# Patient Record
Sex: FEMALE | Race: OTHER | URBAN - METROPOLITAN AREA
[De-identification: names, ages, dates, MRNs, and addresses within clinical notes are randomized per-mention and may not be internally consistent; named-entity substitution may affect disease eponyms.]

---

## 2024-01-23 ENCOUNTER — OFFICE VISIT (OUTPATIENT)
Dept: INTERNAL MEDICINE CLINIC | Facility: CLINIC | Age: 34
End: 2024-01-23

## 2024-01-23 VITALS
HEART RATE: 64 BPM | TEMPERATURE: 98 F | BODY MASS INDEX: 24.62 KG/M2 | WEIGHT: 133.8 LBS | SYSTOLIC BLOOD PRESSURE: 128 MMHG | OXYGEN SATURATION: 99 % | HEIGHT: 62 IN | DIASTOLIC BLOOD PRESSURE: 78 MMHG

## 2024-01-23 DIAGNOSIS — M25.561 CHRONIC PAIN OF RIGHT KNEE: ICD-10-CM

## 2024-01-23 DIAGNOSIS — S89.91XA INJURY OF RIGHT KNEE, INITIAL ENCOUNTER: Primary | ICD-10-CM

## 2024-01-23 DIAGNOSIS — Z23 ENCOUNTER FOR IMMUNIZATION: ICD-10-CM

## 2024-01-23 DIAGNOSIS — M25.361 KNEE INSTABILITY, RIGHT: ICD-10-CM

## 2024-01-23 DIAGNOSIS — G89.29 CHRONIC PAIN OF RIGHT KNEE: ICD-10-CM

## 2024-01-23 PROCEDURE — 90471 IMMUNIZATION ADMIN: CPT

## 2024-01-23 PROCEDURE — 99203 OFFICE O/P NEW LOW 30 MIN: CPT | Performed by: FAMILY MEDICINE

## 2024-01-23 PROCEDURE — 90686 IIV4 VACC NO PRSV 0.5 ML IM: CPT

## 2024-01-23 NOTE — PROGRESS NOTES
Name: Traci Calderón      : 1990      MRN: [id number]  Encounter Provider: Gloria Puri MD  Encounter Date: 2024   Encounter department: [facility]    Assessment & Plan     1. Injury of right knee, initial encounter  Assessment & Plan:  See detailed HPI pt fell onto R knee ~ 4 mo ago full body weight , she had initial swelling pain , more lateral , and noted deviation of R knee when standing medially , deformed , + unstable as well , she used menthol applications and wrapped the knee and used walker x 4 weeks . 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 Encounter for immunization  -     influenza vaccine, quadrivalent, 0.5 mL, preservative-free, for adult and pediatric patients 6 mos+ (AFLURIA, FLUARIX, FLULAVAL, FLUZONE)    3. Chronic pain of right knee  -     Ambulatory Referral to Orthopedic Surgery; Future    4. Knee instability, right  -     Ambulatory Referral to Orthopedic Surgery; Future           Subjective      Knee Pain      Pt fell ~ 4 months ago injured knee , interpretor # [id number], name Omer present during exam. Pt fell ~ 4 mo ago , whole body fell on top of knee + pain, + swelling , bruising , entire knee hurt , - prior injury , no ice applied , she rubbed menthol and applied bandage , did this x she was using a walker as well did this x ~ 4 weeks , after that she has been able to walk but the pain has persisted , she has noted deviation of that knee , it feels weak and can lock as well , many times at work , R knee feels weak and she  "can mis step  trip . 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   HENT:  Negative for ear pain and sore throat.    Eyes:  Negative for pain and visual disturbance.   Respiratory:  Negative for cough and shortness of breath.    Cardiovascular:  Negative for chest pain and palpitations.   Gastrointestinal:  Negative for abdominal pain and vomiting.   Genitourinary:  Negative for dysuria and hematuria.   Musculoskeletal:  Positive for gait problem. Negative for arthralgias and back pain.        R Knee pain , deformity , weakness since her fall    Skin:  Negative for color change and rash.   Neurological:  Negative for seizures and syncope.   All other systems reviewed and are negative.      No current outpatient medications on file prior to visit.       Objective     /78 (BP Location: Right arm, Patient Position: Sitting, Cuff Size: Standard)   Pulse 64   Temp 98 °F (36.7 °C) (Temporal)   Ht 5' 2\" (1.575 m)   Wt 60.7 kg (133 lb 12.8 oz)   SpO2 99%   BMI 24.47 kg/m²     Physical Exam  Constitutional:       Comments: Skin with good color turgor , well hydrated ,no distress noted     HENT:      Head: Normocephalic and atraumatic.   Cardiovascular:      Rate and Rhythm: Normal rate and regular rhythm.      Heart sounds: Normal heart sounds.   Pulmonary:      Effort: No tachypnea or respiratory distress.      Breath sounds: Normal breath sounds.   Musculoskeletal:      Right knee: Swelling and deformity present. No crepitus. Decreased range of motion. Tenderness present over the MCL. Abnormal alignment.      Comments: R knee when standing medial lat fullness deformity , pain with lat and med movement of knee , ttp R lat knee    Neurological:      Comments: Non focal exam pt walks favoring R knee        Gloria Puri MD    "

## 2024-01-23 NOTE — ASSESSMENT & PLAN NOTE
See detailed HPI pt fell onto R knee ~ 4 mo ago full body weight , she had initial swelling pain , more lateral , and noted deviation of R knee when standing medially , deformed , + unstable as well , she used menthol applications and wrapped the knee and used walker x 4 weeks . 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

## 2024-01-30 ENCOUNTER — OFFICE VISIT (OUTPATIENT)
Dept: OBGYN CLINIC | Facility: CLINIC | Age: 34
End: 2024-01-30

## 2024-01-30 ENCOUNTER — APPOINTMENT (OUTPATIENT)
Dept: RADIOLOGY | Age: 34
End: 2024-01-30

## 2024-01-30 VITALS
SYSTOLIC BLOOD PRESSURE: 129 MMHG | HEART RATE: 66 BPM | WEIGHT: 133 LBS | DIASTOLIC BLOOD PRESSURE: 79 MMHG | HEIGHT: 62 IN | BODY MASS INDEX: 24.48 KG/M2

## 2024-01-30 DIAGNOSIS — M25.561 CHRONIC PAIN OF RIGHT KNEE: ICD-10-CM

## 2024-01-30 DIAGNOSIS — G89.29 CHRONIC PAIN OF RIGHT KNEE: ICD-10-CM

## 2024-01-30 DIAGNOSIS — S89.91XA INJURY OF RIGHT KNEE, INITIAL ENCOUNTER: ICD-10-CM

## 2024-01-30 DIAGNOSIS — M25.361 KNEE INSTABILITY, RIGHT: ICD-10-CM

## 2024-01-30 DIAGNOSIS — M17.31 POST-TRAUMATIC OSTEOARTHRITIS OF RIGHT KNEE: Primary | ICD-10-CM

## 2024-01-30 PROCEDURE — 20610 DRAIN/INJ JOINT/BURSA W/O US: CPT | Performed by: STUDENT IN AN ORGANIZED HEALTH CARE EDUCATION/TRAINING PROGRAM

## 2024-01-30 PROCEDURE — 73562 X-RAY EXAM OF KNEE 3: CPT

## 2024-01-30 PROCEDURE — 73564 X-RAY EXAM KNEE 4 OR MORE: CPT

## 2024-01-30 PROCEDURE — 99243 OFF/OP CNSLTJ NEW/EST LOW 30: CPT | Performed by: STUDENT IN AN ORGANIZED HEALTH CARE EDUCATION/TRAINING PROGRAM

## 2024-01-30 RX ORDER — BETAMETHASONE SODIUM PHOSPHATE AND BETAMETHASONE ACETATE 3; 3 MG/ML; MG/ML
12 INJECTION, SUSPENSION INTRA-ARTICULAR; INTRALESIONAL; INTRAMUSCULAR; SOFT TISSUE
Status: COMPLETED | OUTPATIENT
Start: 2024-01-30 | End: 2024-01-30

## 2024-01-30 RX ORDER — LIDOCAINE HYDROCHLORIDE 10 MG/ML
4 INJECTION, SOLUTION INFILTRATION; PERINEURAL
Status: COMPLETED | OUTPATIENT
Start: 2024-01-30 | End: 2024-01-30

## 2024-01-30 RX ADMIN — BETAMETHASONE SODIUM PHOSPHATE AND BETAMETHASONE ACETATE 12 MG: 3; 3 INJECTION, SUSPENSION INTRA-ARTICULAR; INTRALESIONAL; INTRAMUSCULAR; SOFT TISSUE at 13:30

## 2024-01-30 RX ADMIN — LIDOCAINE HYDROCHLORIDE 4 ML: 10 INJECTION, SOLUTION INFILTRATION; PERINEURAL at 13:30

## 2024-01-30 NOTE — LETTER
2024     Gloria Puri MD  [address] [place] [address]   [address]  [facility] [address]    Patient: Traci Calderón   YOB: 1990   Date of Visit: 2024       Dear Dr. Oswald Puri:    Thank you for referring Traci Calderón to me for evaluation. Below are my notes for this consultation.    If you have questions, please do not hesitate to call me. I look forward to following your patient along with you.         Sincerely,        Milton Christensen MD        CC: No Recipients    Milton Christensen MD  2024  4:52 PM  Signed        Date: 24  Traci Calderón   MRN# [id number]  : 1990      Chief Complaint: Right Knee Pain    Assessment and Plan:    1. Post-traumatic osteoarthritis of right knee  Lateral  Knee Brace    Ambulatory Referral to Physical Therapy    Large joint arthrocentesis: R knee      2. Chronic pain of right knee  Ambulatory Referral to Orthopedic Surgery    XR knee 4+ vw right injury    XR knee 3 vw left non injury    Lateral  Knee Brace    Ambulatory Referral to Physical Therapy      3. Injury of right knee, initial encounter  Ambulatory Referral to Orthopedic Surgery    XR knee 4+ vw right injury    XR knee 3 vw left non injury    Lateral  Knee Brace    Ambulatory Referral to Physical Therapy    Large joint arthrocentesis: R knee      4. Knee instability, right  Ambulatory Referral to Orthopedic Surgery    XR knee 4+ vw right injury    XR knee 3 vw left non injury    Lateral  Knee Brace    Ambulatory Referral to Physical Therapy    Large joint arthrocentesis: R knee        Traci upon examination and review of the x-rays of the right knee demonstrate signs symptoms consistent with posttraumatic osteoarthritis.  I do believe that she may have suffered a fracture at her fall in October.  However, it is not definitive.  Further delineation of care was discussed with her and the plan is as follows:    -WBAT  -Activity modification  "to limit strain or impact on the joint  -ibuprofen (Motrin) as needed  -Tylenol contraindicated given allergy  -Supervised physical therapy. Script provided   -Home exercise program directed by PT  -Lateral  brace ordered  -Corticosteroid injection was offered, accepted, and administered.  Risk benefits and alternatives were discussed prior to injection.  Patient tolerated the procedure well.  -Patient may follow up in 3 month(s) for further evaluation and treatment        The patient verbalized understanding of exam findings and treatment plan. We engaged in the shared decision-making process and treatment options were discussed at length with the patient. Surgical and conservative management discussed today along with risks and benefits. Patient was agreeable with the plan and all questions were answered to satisfaction.     Large joint arthrocentesis: R knee  Universal Protocol:  Consent: Verbal consent obtained.  Risks and benefits: risks, benefits and alternatives were discussed  Consent given by: patient  Time out: Immediately prior to procedure a \"time out\" was called to verify the correct patient, procedure, equipment, support staff and site/side marked as required.  Timeout called at: 1/30/2024 2:17 PM.  Patient understanding: patient states understanding of the procedure being performed  Site marked: the operative site was marked  Patient identity confirmed: verbally with patient  Supporting Documentation  Indications: pain   Procedure Details  Location: knee - R knee  Preparation: Patient was prepped and draped in the usual sterile fashion  Needle size: 22 G  Ultrasound guidance: no  Approach: lateral.  Medications administered: 4 mL lidocaine 1 %; 12 mg betamethasone acetate-betamethasone sodium phosphate 6 (3-3) mg/mL    Patient tolerance: patient tolerated the procedure well with no immediate complications  Dressing:  Sterile dressing applied             Subjective:     Knee Pain  Patient " presents to the clinic today with complaints of right knee pain.  A Mongolian  was utilized for the completion of this visit.  She states that she did suffer a fall in 2023.  This did result in injury to her knee resulting in the use of a walker.  She states that her pain is at the anterior and lateral aspect.  Pain is exacerbated with kneeling and bending.  She has difficulty with thinking of her child due to this pain.  She denies any gross instability.  She does localize pain to the anterior lateral aspect of her knee and describes as moderate sharp and symptoms aching pain.  Today she denies any distal paresthesias.      External Records Reviewed: office notes and x-ray reports    Prior treatment:  NSAIDs Yes    Bracing Yes   Physical Therapy No   Cortisone Injections No   Viscosupplementation No     Allergy:  Allergies   Allergen Reactions   • Paracetamol [Acetaminophen] Rash     Medications:  All current active meds have been reviewed   Past Medical History:  History reviewed. No pertinent past medical history.  Past Surgical History:  Past Surgical History:   Procedure Laterality Date   •  SECTION           Family History:  Family History   Problem Relation Age of Onset   • No Known Problems Mother    • Hypertension Father    • No Known Problems Sister    • No Known Problems Sister    • No Known Problems Sister    • No Known Problems Brother    • No Known Problems Daughter      Social History:  Social History     Substance and Sexual Activity   Alcohol Use Not Currently    Comment: occ     Social History     Substance and Sexual Activity   Drug Use Never     Social History     Tobacco Use   Smoking Status Never   Smokeless Tobacco Never           Review of Systems:  General- denies fever/chills  HEENT- denies hearing loss or sore throat  Eyes- denies eye pain or visual disturbances, denies red eyes  Respiratory- denies cough or SOB  Cardio- denies chest pain or  "palpitations  GI- denies abdominal pain  Endocrine- denies urinary frequency  Urinary- denies pain with urination  Musculoskeletal- Negative except noted above  Skin- denies rashes or wounds  Neurological- denies dizziness or headache  Psychiatric- denies anxiety or difficulty concentrating      Objective:   BP Readings from Last 1 Encounters:   01/30/24 129/79      Wt Readings from Last 1 Encounters:   01/30/24 60.3 kg (133 lb)      Pulse Readings from Last 1 Encounters:   01/30/24 66        BMI: Estimated body mass index is 24.33 kg/m² as calculated from the following:    Height as of this encounter: 5' 2\" (1.575 m).    Weight as of this encounter: 60.3 kg (133 lb).      Physical Exam  /79   Pulse 66   Ht 5' 2\" (1.575 m)   Wt 60.3 kg (133 lb)   BMI 24.33 kg/m²   General/Constitutional: No apparent distress: well-nourished and well developed.  Eyes: normal ocular motion  Cardio: RRR, Normal S1S2, No m/r/g.   Lymphatic: No appreciable lymphadenopathy  Respiratory: Non-labored breathing, CTA b/l no w/c/r  Vascular: No edema, swelling or tenderness, except as noted in detailed exam. Extremities well perfused. No LE edema  Integumentary: No impressive skin lesions present, except as noted in detailed exam.  Neuro: No ataxia or tremors noted  Psych: Normal mood and affect, oriented to person, place and time. Appropriate affect.  Musculoskeletal: Normal, except as noted in detailed exam and in HPI.    Gait and Station:   antalgic    Bilateral Lower Extremity:  Right Knee:      Inspection:  normal color, temperature, turgor and moisture    Overall limb alignment: valgus    Effusion: no    ROM 0 to 130 with pain    Extensor Lag: Absent    Palpation: Lateral joint line tenderness to palpation    stable to AP translation at 90 deg    Coronal plane unstable in full extension    Coronal plane unstable in mid-flexion     Motor: 5/5 EHL/FHL/TA/GS/Qd/Hs    Vascular: Toes WWP with BCR    Sensory: SILT " DP/SP/Irma/Saph/Ti    Left Knee:      Inspection: No erythema, ecchymosis, abrasions, muscle atrophy or scars present    Overall limb alignment: neutral    Effusion: no    ROM 0 to 140 without pain    Extensor Lag: Absent    Palpation: No joint line tenderness to palpation    Stable to AP translation at 90 deg    Coronal plane stable in full extension    Coronal plane stable in mid-flexion     Motor: 5/5 EHL/FHL/TA/GS/Qd/Hs    Vascular: Toes WWP with BCR    Sensory: SILT DP/SP/Irma/Saph/Tib         Images:  I personally reviewed relevant images in the PACS system and my interpretation is as follows:    X-rays of the right knee: Demonstrate moderate lateral compartment post traumatic osteoarthritis with a depression of the lateral tibial plateau when compared to the contralateral side.        Scribe Attestation      I,:  Roni Pak am acting as a scribe while in the presence of the attending physician.:       I,:  Milton Christensen MD personally performed the services described in this documentation    as scribed in my presence.:               Milton Christensen MD  Adult Reconstruction Specialist   [facility]

## 2024-01-30 NOTE — PROGRESS NOTES
Date: 24  Traci Calderón   MRN# [id number]  : 1990      Chief Complaint: Right Knee Pain    Assessment and Plan:    1. Post-traumatic osteoarthritis of right knee  Lateral  Knee Brace    Ambulatory Referral to Physical Therapy    Large joint arthrocentesis: R knee      2. Chronic pain of right knee  Ambulatory Referral to Orthopedic Surgery    XR knee 4+ vw right injury    XR knee 3 vw left non injury    Lateral  Knee Brace    Ambulatory Referral to Physical Therapy      3. Injury of right knee, initial encounter  Ambulatory Referral to Orthopedic Surgery    XR knee 4+ vw right injury    XR knee 3 vw left non injury    Lateral  Knee Brace    Ambulatory Referral to Physical Therapy    Large joint arthrocentesis: R knee      4. Knee instability, right  Ambulatory Referral to Orthopedic Surgery    XR knee 4+ vw right injury    XR knee 3 vw left non injury    Lateral  Knee Brace    Ambulatory Referral to Physical Therapy    Large joint arthrocentesis: R knee        Traci upon examination and review of the x-rays of the right knee demonstrate signs symptoms consistent with posttraumatic osteoarthritis.  I do believe that she may have suffered a fracture at her fall in October.  However, it is not definitive.  Further delineation of care was discussed with her and the plan is as follows:    -WBAT  -Activity modification to limit strain or impact on the joint  -ibuprofen (Motrin) as needed  -Tylenol contraindicated given allergy  -Supervised physical therapy. Script provided   -Home exercise program directed by PT  -Lateral  brace ordered  -Corticosteroid injection was offered, accepted, and administered.  Risk benefits and alternatives were discussed prior to injection.  Patient tolerated the procedure well.  -Patient may follow up in 3 month(s) for further evaluation and treatment        The patient verbalized understanding of exam findings and treatment plan.  "We engaged in the shared decision-making process and treatment options were discussed at length with the patient. Surgical and conservative management discussed today along with risks and benefits. Patient was agreeable with the plan and all questions were answered to satisfaction.     Large joint arthrocentesis: R knee  Universal Protocol:  Consent: Verbal consent obtained.  Risks and benefits: risks, benefits and alternatives were discussed  Consent given by: patient  Time out: Immediately prior to procedure a \"time out\" was called to verify the correct patient, procedure, equipment, support staff and site/side marked as required.  Timeout called at: 1/30/2024 2:17 PM.  Patient understanding: patient states understanding of the procedure being performed  Site marked: the operative site was marked  Patient identity confirmed: verbally with patient  Supporting Documentation  Indications: pain   Procedure Details  Location: knee - R knee  Preparation: Patient was prepped and draped in the usual sterile fashion  Needle size: 22 G  Ultrasound guidance: no  Approach: lateral.  Medications administered: 4 mL lidocaine 1 %; 12 mg betamethasone acetate-betamethasone sodium phosphate 6 (3-3) mg/mL    Patient tolerance: patient tolerated the procedure well with no immediate complications  Dressing:  Sterile dressing applied             Subjective:     Knee Pain  Patient presents to the clinic today with complaints of right knee pain.  A Vietnamese  was utilized for the completion of this visit.  She states that she did suffer a fall in October 2023.  This did result in injury to her knee resulting in the use of a walker.  She states that her pain is at the anterior and lateral aspect.  Pain is exacerbated with kneeling and bending.  She has difficulty with thinking of her child due to this pain.  She denies any gross instability.  She does localize pain to the anterior lateral aspect of her knee and " describes as moderate sharp and symptoms aching pain.  Today she denies any distal paresthesias.      External Records Reviewed: office notes and x-ray reports    Prior treatment:  NSAIDs Yes    Bracing Yes   Physical Therapy No   Cortisone Injections No   Viscosupplementation No     Allergy:  Allergies   Allergen Reactions    Paracetamol [Acetaminophen] Rash     Medications:  All current active meds have been reviewed   Past Medical History:  History reviewed. No pertinent past medical history.  Past Surgical History:  Past Surgical History:   Procedure Laterality Date     SECTION           Family History:  Family History   Problem Relation Age of Onset    No Known Problems Mother     Hypertension Father     No Known Problems Sister     No Known Problems Sister     No Known Problems Sister     No Known Problems Brother     No Known Problems Daughter      Social History:  Social History     Substance and Sexual Activity   Alcohol Use Not Currently    Comment: occ     Social History     Substance and Sexual Activity   Drug Use Never     Social History     Tobacco Use   Smoking Status Never   Smokeless Tobacco Never           Review of Systems:  General- denies fever/chills  HEENT- denies hearing loss or sore throat  Eyes- denies eye pain or visual disturbances, denies red eyes  Respiratory- denies cough or SOB  Cardio- denies chest pain or palpitations  GI- denies abdominal pain  Endocrine- denies urinary frequency  Urinary- denies pain with urination  Musculoskeletal- Negative except noted above  Skin- denies rashes or wounds  Neurological- denies dizziness or headache  Psychiatric- denies anxiety or difficulty concentrating      Objective:   BP Readings from Last 1 Encounters:   24 129/79      Wt Readings from Last 1 Encounters:   24 60.3 kg (133 lb)      Pulse Readings from Last 1 Encounters:   24 66        BMI: Estimated body mass index is 24.33 kg/m² as calculated from the  "following:    Height as of this encounter: 5' 2\" (1.575 m).    Weight as of this encounter: 60.3 kg (133 lb).      Physical Exam  /79   Pulse 66   Ht 5' 2\" (1.575 m)   Wt 60.3 kg (133 lb)   BMI 24.33 kg/m²   General/Constitutional: No apparent distress: well-nourished and well developed.  Eyes: normal ocular motion  Cardio: RRR, Normal S1S2, No m/r/g.   Lymphatic: No appreciable lymphadenopathy  Respiratory: Non-labored breathing, CTA b/l no w/c/r  Vascular: No edema, swelling or tenderness, except as noted in detailed exam. Extremities well perfused. No LE edema  Integumentary: No impressive skin lesions present, except as noted in detailed exam.  Neuro: No ataxia or tremors noted  Psych: Normal mood and affect, oriented to person, place and time. Appropriate affect.  Musculoskeletal: Normal, except as noted in detailed exam and in HPI.    Gait and Station:   antalgic    Bilateral Lower Extremity:  Right Knee:      Inspection:  normal color, temperature, turgor and moisture    Overall limb alignment: valgus    Effusion: no    ROM 0 to 130 with pain    Extensor Lag: Absent    Palpation: Lateral joint line tenderness to palpation    stable to AP translation at 90 deg    Coronal plane unstable in full extension    Coronal plane unstable in mid-flexion     Motor: 5/5 EHL/FHL/TA/GS/Qd/Hs    Vascular: Toes WWP with BCR    Sensory: SILT DP/SP/Irma/Saph/Ti    Left Knee:      Inspection: No erythema, ecchymosis, abrasions, muscle atrophy or scars present    Overall limb alignment: neutral    Effusion: no    ROM 0 to 140 without pain    Extensor Lag: Absent    Palpation: No joint line tenderness to palpation    Stable to AP translation at 90 deg    Coronal plane stable in full extension    Coronal plane stable in mid-flexion     Motor: 5/5 EHL/FHL/TA/GS/Qd/Hs    Vascular: Toes WWP with BCR    Sensory: SILT DP/SP/Irma/Saph/Tib         Images:  I personally reviewed relevant images in the PACS system and my " interpretation is as follows:    X-rays of the right knee: Demonstrate moderate lateral compartment post traumatic osteoarthritis with a depression of the lateral tibial plateau when compared to the contralateral side.        Scribe Attestation      I,:  Roni Pak am acting as a scribe while in the presence of the attending physician.:       I,:  Milton Christensen MD personally performed the services described in this documentation    as scribed in my presence.:               Milton Christensen MD  Adult Reconstruction Specialist   [facility]

## 2024-04-30 ENCOUNTER — OFFICE VISIT (OUTPATIENT)
Dept: OBGYN CLINIC | Facility: CLINIC | Age: 34
End: 2024-04-30

## 2024-04-30 VITALS
DIASTOLIC BLOOD PRESSURE: 78 MMHG | WEIGHT: 133 LBS | HEART RATE: 54 BPM | HEIGHT: 62 IN | SYSTOLIC BLOOD PRESSURE: 127 MMHG | BODY MASS INDEX: 24.48 KG/M2

## 2024-04-30 DIAGNOSIS — M17.31 POST-TRAUMATIC OSTEOARTHRITIS OF RIGHT KNEE: Primary | ICD-10-CM

## 2024-04-30 PROCEDURE — 99214 OFFICE O/P EST MOD 30 MIN: CPT | Performed by: STUDENT IN AN ORGANIZED HEALTH CARE EDUCATION/TRAINING PROGRAM

## 2024-04-30 PROCEDURE — 20610 DRAIN/INJ JOINT/BURSA W/O US: CPT

## 2024-04-30 RX ORDER — LIDOCAINE HYDROCHLORIDE 10 MG/ML
1 INJECTION, SOLUTION INFILTRATION; PERINEURAL
Status: COMPLETED | OUTPATIENT
Start: 2024-04-30 | End: 2024-04-30

## 2024-04-30 RX ORDER — BUPIVACAINE HYDROCHLORIDE 2.5 MG/ML
1 INJECTION, SOLUTION INFILTRATION; PERINEURAL
Status: COMPLETED | OUTPATIENT
Start: 2024-04-30 | End: 2024-04-30

## 2024-04-30 RX ORDER — BETAMETHASONE SODIUM PHOSPHATE AND BETAMETHASONE ACETATE 3; 3 MG/ML; MG/ML
12 INJECTION, SUSPENSION INTRA-ARTICULAR; INTRALESIONAL; INTRAMUSCULAR; SOFT TISSUE
Status: COMPLETED | OUTPATIENT
Start: 2024-04-30 | End: 2024-04-30

## 2024-04-30 RX ADMIN — LIDOCAINE HYDROCHLORIDE 1 ML: 10 INJECTION, SOLUTION INFILTRATION; PERINEURAL at 12:45

## 2024-04-30 RX ADMIN — BETAMETHASONE SODIUM PHOSPHATE AND BETAMETHASONE ACETATE 12 MG: 3; 3 INJECTION, SUSPENSION INTRA-ARTICULAR; INTRALESIONAL; INTRAMUSCULAR; SOFT TISSUE at 12:45

## 2024-04-30 RX ADMIN — BUPIVACAINE HYDROCHLORIDE 1 ML: 2.5 INJECTION, SOLUTION INFILTRATION; PERINEURAL at 12:45

## 2024-04-30 NOTE — PROGRESS NOTES
Date: 24  Traci Calderón   MRN# [id number]  : 1990      Chief Complaint: Right Knee Pain    Assessment and Plan:  The patient verbalized understanding of exam findings and treatment plan. We engaged in the shared decision-making process and treatment options were discussed at length with the patient. Surgical and conservative management discussed today along with risks and benefits. Patient was agreeable with the plan and all questions were answered to satisfaction.     Post-traumatic osteoarthritis of right knee  Patient is a f/u for right knee osteoarthritis  -WBAT  -Activity modification to limit strain or impact on the joint  -celecoxib (Celebrex) as needed  -Tylenol 1000mg up to three times daily as needed. Do not exceed 3000mg daily  -Knee sleeve or brace for comfort - lateral  brace ordered  -Cane or walker recommended to offload joint  -Corticosteroid injection was offered, accepted, and administered.  Risk benefits and alternatives were discussed prior to injection.  Patient tolerated the procedure well.  - Patient is a candidate for a total knee replacement. She cannot take off work now and will defer and continue with injections   -Patient may follow up prn for further evaluation and treatment        Subjective:   Traci Calderón is a 33 y.o. female who is being seen in follow-up for Right knee pain. Patient states she got about 1 month of relief from her previous injection. When we last saw she we recommended monitor for inj improvement.  Pain improved then worsened. No other orthopedic complaints or concerns.     Prior treatment:  NSAIDs Yes    Bracing No   Physical Therapy Yes   Cortisone Injections Yes   Viscosupplementation No     Allergy:  Allergies   Allergen Reactions    Paracetamol [Acetaminophen] Rash     Medications:  All current active meds have been reviewed   Past Medical History:  History reviewed. No pertinent past medical history.  Past Surgical  "History:  Past Surgical History:   Procedure Laterality Date     SECTION           Family History:  Family History   Problem Relation Age of Onset    No Known Problems Mother     Hypertension Father     No Known Problems Sister     No Known Problems Sister     No Known Problems Sister     No Known Problems Brother     No Known Problems Daughter      Social History:  Social History     Substance and Sexual Activity   Alcohol Use Not Currently    Comment: occ     Social History     Substance and Sexual Activity   Drug Use Never     Social History     Tobacco Use   Smoking Status Never   Smokeless Tobacco Never           Review of Systems:  General- denies fever/chills  HEENT- denies hearing loss or sore throat  Eyes- denies eye pain or visual disturbances, denies red eyes  Respiratory- denies cough or SOB  Cardio- denies chest pain or palpitations  GI- denies abdominal pain  Endocrine- denies urinary frequency  Urinary- denies pain with urination  Musculoskeletal- Negative except noted above  Skin- denies rashes or wounds  Neurological- denies dizziness or headache  Psychiatric- denies anxiety or difficulty concentrating    Objective:   BP Readings from Last 1 Encounters:   24 127/78      Wt Readings from Last 1 Encounters:   24 60.3 kg (133 lb)      Pulse Readings from Last 1 Encounters:   24 (!) 54        BMI: Estimated body mass index is 24.33 kg/m² as calculated from the following:    Height as of this encounter: 5' 2\" (1.575 m).    Weight as of this encounter: 60.3 kg (133 lb).    Physical Exam  /78   Pulse (!) 54   Ht 5' 2\" (1.575 m)   Wt 60.3 kg (133 lb)   BMI 24.33 kg/m²   General/Constitutional: No apparent distress: well-nourished and well developed.  Eyes: normal ocular motion  Cardio: RRR, Normal S1S2, No m/r/g.   Lymphatic: No appreciable lymphadenopathy  Respiratory: Non-labored breathing, CTA b/l no w/c/r  Vascular: No edema, swelling or tenderness, except as " noted in detailed exam. Extremities well perfused. No LE edema  Integumentary: No impressive skin lesions present, except as noted in detailed exam.  Neuro: No ataxia or tremors noted  Psych: Normal mood and affect, oriented to person, place and time. Appropriate affect.  Musculoskeletal: Normal, except as noted in detailed exam and in HPI.    Gait and Station:   normal    Right Knee Exam:    Right Knee:      Inspection:  normal color, temperature, turgor and moisture    Overall limb alignment: valgus  Fullness in popliteal fossa    Effusion: no    ROM 0 to 130 with pain    Extensor Lag: Absent    Palpation: Lateral joint line tenderness to palpation    stable to AP translation at 90 deg    Coronal plane unstable in full extension    Coronal plane unstable in mid-flexion     Motor: 5/5 EHL/FHL/TA/GS/Qd/Hs    Vascular: Toes WWP with BCR    Sensory: SILT DP/SP/Irma/Saph/Ti      Images:  No new imaging    Large joint arthrocentesis: R knee  Universal Protocol:  Consent: Verbal consent obtained.  Consent given by: patient  Timeout called at: 4/30/2024 1:22 PM.  Patient understanding: patient states understanding of the procedure being performed  Patient identity confirmed: verbally with patient  Supporting Documentation  Indications: pain and diagnostic evaluation   Procedure Details  Location: knee - R knee  Needle size: 22 G  Ultrasound guidance: no  Approach: anterolateral  Medications administered: 12 mg betamethasone acetate-betamethasone sodium phosphate 6 (3-3) mg/mL; 1 mL bupivacaine 0.25 %; 1 mL lidocaine 1 %    Patient tolerance: patient tolerated the procedure well with no immediate complications  Dressing:  Sterile dressing applied            Scribe Attestation      I,:  Emanuel Bell PA-C am acting as a scribe while in the presence of the attending physician.:       I,:  Milton Christensen MD personally performed the services described in this documentation    as scribed in my presence.:               Milton  MD Fermin  Adult Reconstruction Specialist   [facility]

## 2024-04-30 NOTE — ASSESSMENT & PLAN NOTE
Patient is a f/u for right knee osteoarthritis  -WBAT  -Activity modification to limit strain or impact on the joint  -celecoxib (Celebrex) as needed  -Tylenol 1000mg up to three times daily as needed. Do not exceed 3000mg daily  -Knee sleeve or brace for comfort - lateral  brace ordered  -Cane or walker recommended to offload joint  -Corticosteroid injection was offered, accepted, and administered.  Risk benefits and alternatives were discussed prior to injection.  Patient tolerated the procedure well.  - Patient is a candidate for a total knee replacement. She cannot take off work now and will defer and continue with injections   -Patient may follow up in 3 months for further evaluation and treatment

## 2024-04-30 NOTE — PATIENT INSTRUCTIONS
Below are a list of brace makers to obtain the knee brace.       [facility]  [address] [place] [address]  Directions  [place] Phone: [phone]  [place] Fax: [phone]    [facility] [address]  [address]  [place] [address]  (By Appointment Only)  Directions  [address]  [address]  [place] [address]  Directions  [place] Phone: [phone]  [place] Fax: [phone]    [facility] Location  [address], [address]  [place] [facility] [address]  [phone] (fax)    [facility] Location  [address], [address]  [place] [address]  [phone] (fax)

## 2024-09-03 ENCOUNTER — OFFICE VISIT (OUTPATIENT)
Dept: DENTISTRY | Facility: CLINIC | Age: 34
End: 2024-09-03

## 2024-09-03 DIAGNOSIS — Z01.20 ENCOUNTER FOR DENTAL EXAMINATION: Primary | ICD-10-CM

## 2024-09-03 PROCEDURE — D0210 INTRAORAL - COMPLETE SERIES OF RADIOGRAPHIC IMAGES: HCPCS

## 2024-09-03 PROCEDURE — D0150 COMPREHENSIVE ORAL EVALUATION - NEW OR ESTABLISHED PATIENT: HCPCS

## 2024-09-03 NOTE — DENTAL PROCEDURE DETAILS
COMP EXAM, FMX, PROBE EXAM   REVIEWED MED HX: meds, allergies, health changes reviewed in EPIC  CHIEF CONCERN:  My tooth root on #12 bothers me   -Last dental visit was More than a year ago  PAIN SCALE:  0  ASA CLASS:  ASA 2 - Patient with mild systemic disease with no functional limitations  PLAQUE:  moderate  CALCULUS: Moderate  BLEEDING:  moderate  STAIN :  Moderate  PERIO: Gingivitis, localized pockets on 30 due to supraeruption    Visual and Tactile Intraoral/ Extraoral evaluation: Oral and Oropharyngeal cancer evaluation. No findings     Dr. Castrejon -  Reviewed with patient clinical and radiographic findings and patient verbalized understanding. All questions and concerns addressed.     REFERRALS: Orthodontic referral provided, give follow up ortho referral after completion of preventative/resorative    CARIES FINDINGS: Extensive; see tx plan 9/3/24. #15 plan for PFM survey crown if patient wants to go with implants. #6-#11 can be planned for crowns after caries control/RCT treatment. #8 needs endo and #11 RCT retreat. #12 and #13 EXT with socket preservation.     NOTE: Irish translation used with Richarela; patient understands that 15 will need a crown. Survey crown recommended if going with partial route, and ceramic crown if implant route. Patient understands and needs time to think but leaning towards implants. Patient recommended for ortho consultation if undergoing implant route.        NEXT VISIT:   1) #12 and #13 EXT w socket preservation  Cash  2) RCT #8 Dr [place]  3) Rct #11  [place]  4) Prophy hygiene schedule  5) #8 #9 #11 Prep and Temp w/ Dr Cash    Last FMX : 9/3/24

## 2025-01-06 ENCOUNTER — OFFICE VISIT (OUTPATIENT)
Dept: DENTISTRY | Facility: CLINIC | Age: 35
End: 2025-01-06

## 2025-01-06 DIAGNOSIS — K08.89 NONRESTORABLE TOOTH: Primary | ICD-10-CM

## 2025-01-06 PROCEDURE — D7953 BONE REPLACEMENT GRAFT FOR RIDGE PRESERVATION - PER SITE: HCPCS

## 2025-01-06 PROCEDURE — D7140 EXTRACTION, ERUPTED TOOTH OR EXPOSED ROOT (ELEVATION AND/OR FORCEPS REMOVAL): HCPCS

## 2025-01-06 RX ORDER — AMOXICILLIN 500 MG/1
500 CAPSULE ORAL EVERY 8 HOURS SCHEDULED
Qty: 21 CAPSULE | Refills: 0 | Status: SHIPPED | OUTPATIENT
Start: 2025-01-06 | End: 2025-01-13

## 2025-01-06 NOTE — PROGRESS NOTES
Extraction and socket preservation #12 and 13    Traci Calderón 34 y.o. female presents with self to [facility] for extraction #12 and 13  PMH reviewed, no changes, ASA II. Significant medical history: reviewed. Significant allergies: reviewed. Significant medications: reviewed.  Pain level 0/10    Diagnosis:  Teeth #12 13 indicated for extraction due to Nonrestorable caries.    Consent:  Risks of specific procedure: pain, bleeding, swelling, infection, tooth fracturing to point of requiring surgical removal, damage to adjacent teeth and/or restorations on them, .  Risks of any dental procedure: post procedural pain or sensitivity, local anesthetic side effects, allergic reaction to dental materials and medications, breakage of local anesthetic needle, aspiration of small dental tools, injury to nearby hard and soft tissues and anatomical structures.  Benefits: relieve pain or underlying infection, prevent future or further progression of infection, .  Alternatives: 2nd opinion, no tx.  Tx plan for extraction #12 13 reviewed, pt given opportunity to ask questions, all questions answered to degree of medical and dental certainty.  Patient understands and consent given by self via verbal consent and signed oral surgery informed consent.    Universal Protocol  Other Assisting Provider: Yes, Main (assistant)  Verbal consent obtained? YES  Written consent obtained?  YES  Risks, benefits and alternatives discussed?: YES  Consent given by: self  Time Out  Immediately prior to the procedure a time out was called: YES  Time Out:  Time Out performed at:  11:15 AM  A time out verifies correct patient, procedure, equipment, support staff and site/side marked as required.  Patient states understanding of procedure being performed: YES  Patient's understanding of procedure matches consent: YES  Procedure consent matches procedure scheduled: YES  Test results available and properly labeled: N/A  Site  Verified with the patient   YES  Radiology Images displayed and confirmed.  If images not available, report reviewed:  YES  Required items - Required blood products, implants, devices and special equipment available: YES  Patient identity confirmed:  YES    Anesthesia:  Topical 20% benzocaine.  2 carps 2% Lidocaine 1:100k epi via buccal infiltration and palatal/lingual infiltration.  1 carps 4% Septocaine 1:100k epi via buccal infiltration and palatal/lingual infiltration.    Procedure details:  Reflected gingiva with periosteal elevator.  Elevated, and extracted #12 13 with straight elevator(s) and/or forceps.  Socket curetted and irrigated with sterile saline.    Collagen membrane placed in buccal flap. Novabone placed in 12 and 13. Collagen membrane tucked to palatal flap. Collagen plug placed on top of membrane.   Manual alveolar compression with gauze 30 seconds. Hemostasis achieved.  4-0 chromic gut sutures placed.     Post-op instructions given verbally and on paper.  Patient given ice and gauze.    Rx: Amoxicillin.    Patient dismissed ambulatory and alert.    NV: 3 week FU Cash.    Attending: Dr. Oseguera was present in clinic.

## 2025-01-28 ENCOUNTER — OFFICE VISIT (OUTPATIENT)
Dept: DENTISTRY | Facility: CLINIC | Age: 35
End: 2025-01-28

## 2025-01-28 DIAGNOSIS — Z01.20 ENCOUNTER FOR DENTAL EXAMINATION: ICD-10-CM

## 2025-01-28 PROCEDURE — D0191 ASSESSMENT OF A PATIENT: HCPCS

## 2025-01-28 NOTE — PROGRESS NOTES
Post op PA taken of 12 and 13. Site evaluated, no active infection present.     NV: 8 RCT // 11 RCT [place] Cash 1.5 hr

## 2025-01-29 ENCOUNTER — OFFICE VISIT (OUTPATIENT)
Dept: DENTISTRY | Facility: CLINIC | Age: 35
End: 2025-01-29

## 2025-01-29 VITALS — DIASTOLIC BLOOD PRESSURE: 67 MMHG | SYSTOLIC BLOOD PRESSURE: 106 MMHG

## 2025-01-29 DIAGNOSIS — Z01.21 ENCOUNTER FOR DENTAL EXAMINATION AND CLEANING WITH ABNORMAL FINDINGS: Primary | ICD-10-CM

## 2025-01-29 PROCEDURE — D1110 PROPHYLAXIS - ADULT: HCPCS

## 2025-01-29 NOTE — PROGRESS NOTES
Procedure Details   - PROPHYLAXIS - ADULT  Pt arrived at [facility] for initial prophy apt.   Reviewed medical history  ASA I  Used Guyanese translation ipad #[id number]    Prophy  Hand scaled, flossed, polished  Pt. A little jumpy when scaling.   Marginal plaque and bldg present  #15 sensitive to pt.   Applied vaseline to cracked lips  Explained to pt to return for trt needs    NV: either #8 rct or #11 retreat rct (not both as per DR. CORMIER) 1.5Hr.  #15 RCT  Rests.  Spot probe to determine need SS SRP.

## 2025-01-29 NOTE — DENTAL PROCEDURE DETAILS
Pt arrived at [facility] for initial prophy apt.   Reviewed medical history  ASA I  Used Albanian translation ipad #[id number]    Prophy  Hand scaled, flossed, polished  Pt. A little jumpy when scaling.   Marginal plaque and bldg present  #15 sensitive to pt.   Applied vaseline to cracked lips  Explained to pt to return for trt needs    NV: either #8 rct or #11 retreat rct (not both as per DR. CORMIER) 1.5Hr.  #15 RCT  Rests.  Spot probe to determine need SS SRP.

## 2025-04-14 ENCOUNTER — OFFICE VISIT (OUTPATIENT)
Dept: DENTISTRY | Facility: CLINIC | Age: 35
End: 2025-04-14

## 2025-04-14 DIAGNOSIS — K04.5 SYMPTOMATIC APICAL PERIODONTITIS: Primary | ICD-10-CM

## 2025-04-14 PROCEDURE — D3346: HCPCS

## 2025-04-14 NOTE — PROGRESS NOTES
RCT Access, Instrumentation, and Obturation #11    Traci Calderón 34 y.o. female presents with self to [facility] for RCT #11.  PMH reviewed, no changes, ASA II. Significant medical history: reviewed. Significant allergies: reviewed. Significant medications: reviewed.  Pain level 3/10.    Diagnosis:  Pulpal - Previously initiated root canal therapy  Periapical - Symptomatic apical periodontitis    Prognosis:  good    Consent:  Risks of specific procedure: pain, swelling, infection, tooth fracture, perforation of tooth, chemical accident, breakage of endodontic instruments within canal, no guarantee against extraction, .  Risks of any dental procedure: post procedural pain or sensitivity, local anesthetic side effects, allergic reaction to dental materials and medications, breakage of local anesthetic needle, aspiration of small dental tools, injury to nearby hard and soft tissues and anatomical structures.  Benefits: relieve pain or underlying infection, attempt to save tooth, .  Alternatives: extraction, , no tx.  Tx plan for RCT #2nd opinion reviewed. Opportunity to ask questions given, all questions answered to degree of medical and dental certainty.  Patient understands and consent given by self via verbal consent.    Anesthesia:  Topical 20% benzocaine.  1 carps 4% Septocaine 1:100k epi via buccal infiltration and palatal/lingual infiltration.    Access:  Clamp and rubber dam placed.   Pulp chamber accessed and caries removed with round carbide.  Access prep refined with Endo-Z bur.  Number of canals: 1.  Location of canal(s): Central.  Working length(s) determined with size 10 hand file and apex locater: 25 mm.    Instrumentation:  Orifice accessed and existing GP removed with Protaper D3 retreat files  Instrumentation completed using WaveOne up to size Medium.  Lfcpnhljk-qcpilorrteojb-oelarggbb throughout instrumentation using 6% NaOCl and size 10 hand file.    Obturation:  Obtained master cone fit check with PA  radiograph.  Performed final irrigation with 6% NaOCl and activation using Endo-activator tip.  Dried canals with paper points.  Obturation completed with single cone and Bioceramic sealer.  Excess GP seared at orifice level using heated sharda percha pen    Close:  Placed Cotton pellet, restored with  resin (distal lingual wall broke off during compaction of orifice canal , and adjusted occlusion.  Post-op PA radiograph taken.    Patient dismissed ambulatory and alert.    NV: 11 post and crown, 11 crown prep and temp .    Attending: Dr. Oseguera was present in clinic.

## 2025-05-12 ENCOUNTER — TELEPHONE (OUTPATIENT)
Dept: INTERNAL MEDICINE CLINIC | Facility: CLINIC | Age: 35
End: 2025-05-12

## 2025-05-23 ENCOUNTER — OFFICE VISIT (OUTPATIENT)
Dept: INTERNAL MEDICINE CLINIC | Facility: CLINIC | Age: 35
End: 2025-05-23

## 2025-05-23 VITALS
TEMPERATURE: 98.4 F | BODY MASS INDEX: 26.01 KG/M2 | SYSTOLIC BLOOD PRESSURE: 103 MMHG | DIASTOLIC BLOOD PRESSURE: 71 MMHG | OXYGEN SATURATION: 100 % | HEART RATE: 58 BPM | WEIGHT: 142.2 LBS

## 2025-05-23 DIAGNOSIS — G89.29 CHRONIC MID BACK PAIN: Primary | ICD-10-CM

## 2025-05-23 DIAGNOSIS — G89.29 CHRONIC PAIN OF RIGHT KNEE: ICD-10-CM

## 2025-05-23 DIAGNOSIS — M25.561 CHRONIC PAIN OF RIGHT KNEE: ICD-10-CM

## 2025-05-23 DIAGNOSIS — M54.9 CHRONIC MID BACK PAIN: Primary | ICD-10-CM

## 2025-05-23 RX ORDER — METHOCARBAMOL 500 MG/1
TABLET, FILM COATED ORAL
Qty: 30 TABLET | Refills: 1 | Status: SHIPPED | OUTPATIENT
Start: 2025-05-23

## 2025-05-23 NOTE — PROGRESS NOTES
Name: Traci Calderón      : 1990      MRN: [id number]  Encounter Provider: Gloria Puri MD  Encounter Date: 2025   Encounter department: [facility]    Assessment & Plan  Chronic mid back pain  Patient has had ~ 5 years of chronic back pain no injury she has R chronic knee pain and this does affect tthe way she stands at work , stands at work 6-7 hrs , 5 days per week   Recommend avoid offending positions actions , continue warm showers ,topical menthol cream applications , start back exercises and stretching daily , robaxin 500 mg po hs work day nights , may take up to q 8 hr on days off , avoid sleeping face down at night , follow up here 4-6 weeks sooner if needed consider course of PT  Orders:  •  methocarbamol (ROBAXIN) 500 mg tablet; 1 po hs for back pain during work days , on days off from work may take 1 po q 8 hr    Chronic pain of right knee  Favoring this knee could be affecting her back ,            History of Present Illness     HPIPatient here with complaint of mid back pain , interpretor #  [id number] present during office visit   She has had this regularly when she sleeps wrong and due to work dating back 5 years   It has been worse x 2 weeks , she has not worked more hours , - injury - change in routine , she has existing knee problem , she stands a lot at work , she places more weight on good knee   She works 5 days 6-7 hrs per shift , she stands the entire time   The back pain can be worse on L or R , it can feel some better when she lays down , she has placed menthol cream , massages tries to sleep on her stomach   She has not taken any OTC  advil she has allergy to tylenol   Review of Systems   Constitutional:  Negative for chills and fever.   HENT:  Negative for ear pain and sore throat.    Eyes:  Negative for pain and visual disturbance.   Respiratory:  Negative for cough and shortness of breath.    Cardiovascular:  Negative for chest pain and  palpitations.   Gastrointestinal:  Negative for abdominal pain and vomiting.   Genitourinary:  Negative for dysuria and hematuria.   Musculoskeletal:  Positive for back pain. Negative for arthralgias.   Skin:  Negative for color change and rash.   Neurological:  Negative for seizures and syncope.   All other systems reviewed and are negative.    Past Medical History[1]  Past Surgical History[2]  Family History[3]  Social History[4]  Medications[5]  Allergies   Allergen Reactions   • Paracetamol [Acetaminophen] Rash     Immunization History   Administered Date(s) Administered   • Influenza, injectable, quadrivalent, preservative free 0.5 mL 01/23/2024     Objective   /71 (BP Location: Left arm, Patient Position: Sitting, Cuff Size: Large)   Pulse 58   Temp 98.4 °F (36.9 °C) (Temporal)   Wt 64.5 kg (142 lb 3.2 oz)   SpO2 100%   BMI 26.01 kg/m²     Physical Exam  Vitals and nursing note reviewed.   Constitutional:       General: She is not in acute distress.     Appearance: She is well-developed.      Comments: Skin with good color turgor , well hydrated ,no distress noted     HENT:      Head: Normocephalic and atraumatic.      Right Ear: No decreased hearing noted. No middle ear effusion. There is no impacted cerumen.      Left Ear: No decreased hearing noted.  No middle ear effusion. There is no impacted cerumen.      Mouth/Throat:      Pharynx: Oropharynx is clear.     Eyes:      Conjunctiva/sclera: Conjunctivae normal.     Neck:      Thyroid: No thyromegaly.     Cardiovascular:      Rate and Rhythm: Normal rate and regular rhythm.      Heart sounds: Normal heart sounds. No murmur heard.  Pulmonary:      Effort: Pulmonary effort is normal. No respiratory distress.      Breath sounds: Normal breath sounds.   Abdominal:      Palpations: Abdomen is soft.      Tenderness: There is no abdominal tenderness.     Musculoskeletal:         General: No swelling.      Cervical back: Neck supple. Normal range of  motion.      Thoracic back: Spasms present. No swelling, deformity, tenderness or bony tenderness. Normal range of motion.      Lumbar back: Normal range of motion.   Lymphadenopathy:      Cervical:      Right cervical: No superficial cervical adenopathy.     Left cervical: No superficial cervical adenopathy.     Skin:     General: Skin is warm and dry.      Capillary Refill: Capillary refill takes less than 2 seconds.     Neurological:      Mental Status: She is alert.      Comments: Non focal exam    Psychiatric:         Mood and Affect: Mood normal.                [1]  No past medical history on file.  [2]  Past Surgical History:  Procedure Laterality Date   •  SECTION      2020   [3]  Family History  Problem Relation Name Age of Onset   • No Known Problems Mother     • Hypertension Father     • No Known Problems Sister     • No Known Problems Sister     • No Known Problems Sister     • No Known Problems Brother     • No Known Problems Daughter     [4]  Social History  Tobacco Use   • Smoking status: Never   • Smokeless tobacco: Never   Vaping Use   • Vaping status: Never Used   Substance and Sexual Activity   • Alcohol use: Not Currently     Comment: occ   • Drug use: Never   [5]  No current outpatient medications on file prior to visit.

## 2025-05-23 NOTE — ASSESSMENT & PLAN NOTE
Patient has had ~ 5 years of chronic back pain no injury she has R chronic knee pain and this does affect tthe way she stands at work , stands at work 6-7 hrs , 5 days per week   Recommend avoid offending positions actions , continue warm showers ,topical menthol cream applications , start back exercises and stretching daily , robaxin 500 mg po hs work day nights , may take up to q 8 hr on days off , avoid sleeping face down at night , follow up here 4-6 weeks sooner if needed consider course of PT  Orders:    methocarbamol (ROBAXIN) 500 mg tablet; 1 po hs for back pain during work days , on days off from work may take 1 po q 8 hr

## 2025-07-13 ENCOUNTER — OFFICE VISIT (OUTPATIENT)
Dept: URGENT CARE | Age: 35
End: 2025-07-13
Payer: COMMERCIAL

## 2025-07-13 VITALS
WEIGHT: 141.7 LBS | HEART RATE: 66 BPM | BODY MASS INDEX: 26.75 KG/M2 | SYSTOLIC BLOOD PRESSURE: 114 MMHG | TEMPERATURE: 97.1 F | DIASTOLIC BLOOD PRESSURE: 72 MMHG | HEIGHT: 61 IN | RESPIRATION RATE: 18 BRPM | OXYGEN SATURATION: 98 %

## 2025-07-13 DIAGNOSIS — Z02.4 DRIVER'S PERMIT PE (PHYSICAL EXAMINATION): Primary | ICD-10-CM

## 2025-07-13 NOTE — PROGRESS NOTES
[facility]  Name: Traic Calderón      : 1990      MRN: [id number]  Encounter Provider: RAÚL Guerrero  Encounter Date: 2025   Encounter department: [facility] [place]  :  Assessment & Plan  's permit PE (physical examination)             Patient Instructions  Physical exam WDL, patient cleared for 's permit.   Follow up with PCP in 3-5 days.  Proceed to  ER if symptoms worsen.    If tests are performed, our office will contact you with results only if changes need to made to the care plan discussed with you at the visit. [facility]    Chief Complaint:   Chief Complaint   Patient presents with    Annual Exam     Patient here for  permit physical     History of Present Illness   Patient is a 34 year old female with no significant PMH, who presents for 's permit physical. She denies history of diabetes, seizures, syncopal episodes, chest pain, shortness of breath, alcohol/drug use.           Review of Systems   Constitutional:  Negative for fatigue and fever.   HENT:  Negative for congestion, ear discharge, ear pain, postnasal drip, rhinorrhea, sinus pressure, sinus pain, sneezing and sore throat.    Eyes: Negative.  Negative for pain, discharge, redness and itching.   Respiratory: Negative.  Negative for apnea, cough, choking, chest tightness, shortness of breath, wheezing and stridor.    Cardiovascular: Negative.  Negative for chest pain and palpitations.   Gastrointestinal: Negative.  Negative for diarrhea, nausea and vomiting.   Endocrine: Negative.  Negative for polydipsia, polyphagia and polyuria.   Genitourinary: Negative.  Negative for decreased urine volume and flank pain.   Musculoskeletal: Negative.  Negative for arthralgias, back pain, myalgias, neck pain and neck stiffness.   Skin: Negative.  Negative for color change and rash.   Allergic/Immunologic: Negative.  Negative for environmental  "allergies.   Neurological: Negative.  Negative for dizziness, facial asymmetry, light-headedness, numbness and headaches.   Hematological: Negative.  Negative for adenopathy.   Psychiatric/Behavioral: Negative.       Past Medical History   Past Medical History[1]  Past Surgical History[2]  Family History[3]  she reports that she has never smoked. She has never used smokeless tobacco. She reports that she does not currently use alcohol. She reports that she does not use drugs.  Current Outpatient Medications   Medication Instructions    methocarbamol (ROBAXIN) 500 mg tablet 1 po hs for back pain during work days , on days off from work may take 1 po q 8 hr   Allergies[4]     Objective   /72 (BP Location: Right arm, Patient Position: Sitting, Cuff Size: Standard)   Pulse 66   Temp (!) 97.1 °F (36.2 °C)   Resp 18   Ht 5' 0.75\" (1.543 m)   Wt 64.3 kg (141 lb 11.2 oz)   SpO2 98%   BMI 26.99 kg/m²      Physical Exam  Vitals and nursing note reviewed.   Constitutional:       General: She is not in acute distress.     Appearance: She is well-developed. She is not ill-appearing, toxic-appearing or diaphoretic.      Interventions: She is not intubated.  HENT:      Head: Normocephalic and atraumatic.     Eyes:      Conjunctiva/sclera: Conjunctivae normal.       Cardiovascular:      Rate and Rhythm: Normal rate and regular rhythm.      Heart sounds: Normal heart sounds, S1 normal and S2 normal. Heart sounds not distant. No murmur heard.  Pulmonary:      Effort: Pulmonary effort is normal. No tachypnea, bradypnea, accessory muscle usage, prolonged expiration, respiratory distress or retractions. She is not intubated.      Breath sounds: Normal breath sounds. No stridor, decreased air movement or transmitted upper airway sounds. No decreased breath sounds, wheezing, rhonchi or rales.   Abdominal:      Palpations: Abdomen is soft.      Tenderness: There is no abdominal tenderness.     Musculoskeletal:         General: " "No swelling.      Cervical back: Neck supple.     Skin:     General: Skin is warm and dry.      Capillary Refill: Capillary refill takes less than 2 seconds.     Neurological:      Mental Status: She is alert.     Psychiatric:         Mood and Affect: Mood normal.         Portions of the record may have been created with voice recognition software.  Occasional wrong word or \"sound a like\" substitutions may have occurred due to the inherent limitations of voice recognition software.  Read the chart carefully and recognize, using context, where substitutions have occurred.       [1] No past medical history on file.  [2]   Past Surgical History:  Procedure Laterality Date     SECTION         [3]   Family History  Problem Relation Name Age of Onset    No Known Problems Mother      Hypertension Father      No Known Problems Sister      No Known Problems Sister      No Known Problems Sister      No Known Problems Brother      No Known Problems Daughter     [4]   Allergies  Allergen Reactions    Paracetamol [Acetaminophen] Rash     "